# Patient Record
Sex: MALE | Race: WHITE | URBAN - METROPOLITAN AREA
[De-identification: names, ages, dates, MRNs, and addresses within clinical notes are randomized per-mention and may not be internally consistent; named-entity substitution may affect disease eponyms.]

---

## 2022-11-09 ENCOUNTER — TELEPHONE (OUTPATIENT)
Dept: INTERNAL MEDICINE CLINIC | Age: 37
End: 2022-11-09

## 2022-11-09 NOTE — TELEPHONE ENCOUNTER
----- Message from Germania Rain sent at 11/8/2022  9:16 AM EST -----  Subject: Appointment Request    Reason for Call: New Patient/New to Provider Appointment needed: New   Patient Request Appointment    QUESTIONS    Reason for appointment request? Requested Provider unavailable - Dr. Kuldeep Correa     Additional Information for Provider? Pt wife is requesting for him to   establish care with Dr. Kuldeep Correa as she is established as well.  Please   contact [facility] for scheduling  ---------------------------------------------------------------------------  --------------  Tiffany Burnette INFO  [phone]; OK to leave message on voicemail  ---------------------------------------------------------------------------  --------------  SCRIPT ANSWERS  COVID Screen: [facility]

## 2022-11-09 NOTE — TELEPHONE ENCOUNTER
[facility] not accepting new patients at this time.  Can establish care with Dr. Mee Frank or Alcario Cooks, NP.

## 2023-01-03 ENCOUNTER — TELEPHONE (OUTPATIENT)
Dept: INTERNAL MEDICINE CLINIC | Age: 38
End: 2023-01-03

## 2023-01-03 NOTE — TELEPHONE ENCOUNTER
Patient's spouse is a patient of Dr. Sabrina Ferguson, and she is hoping that her  can establish care with her as well. Informed spouse that Dr. Dawne Libman not accepting new patients but that I would check since spouse is a patient.

## 2023-01-04 ENCOUNTER — TELEPHONE (OUTPATIENT)
Dept: INTERNAL MEDICINE CLINIC | Age: 38
End: 2023-01-04

## 2023-01-04 NOTE — TELEPHONE ENCOUNTER
Tried to leave a VM but box not set up.  Wanted to let pt know that doctor Ted Washington is not accepting new pts and if he would like to see if another provider would be best for him

## 2023-03-10 ENCOUNTER — OFFICE VISIT (OUTPATIENT)
Dept: INTERNAL MEDICINE CLINIC | Age: 38
End: 2023-03-10

## 2023-03-10 VITALS
BODY MASS INDEX: 29.33 KG/M2 | WEIGHT: 198 LBS | HEIGHT: 69 IN | DIASTOLIC BLOOD PRESSURE: 75 MMHG | TEMPERATURE: 98.2 F | RESPIRATION RATE: 19 BRPM | OXYGEN SATURATION: 96 % | SYSTOLIC BLOOD PRESSURE: 121 MMHG | HEART RATE: 72 BPM

## 2023-03-10 DIAGNOSIS — Z00.00 LABORATORY EXAMINATION ORDERED AS PART OF A COMPLETE PHYSICAL EXAMINATION: ICD-10-CM

## 2023-03-10 DIAGNOSIS — M25.512 ACUTE PAIN OF LEFT SHOULDER: ICD-10-CM

## 2023-03-10 DIAGNOSIS — Z13.220 SCREENING FOR HYPERLIPIDEMIA: ICD-10-CM

## 2023-03-10 DIAGNOSIS — Z76.89 ENCOUNTER TO ESTABLISH CARE: Primary | ICD-10-CM

## 2023-03-10 DIAGNOSIS — G89.29 CHRONIC LEFT SHOULDER PAIN: ICD-10-CM

## 2023-03-10 DIAGNOSIS — R20.2 TINGLING SENSATION: ICD-10-CM

## 2023-03-10 DIAGNOSIS — Z13.1 SCREENING FOR DIABETES MELLITUS (DM): ICD-10-CM

## 2023-03-10 DIAGNOSIS — M25.512 CHRONIC LEFT SHOULDER PAIN: ICD-10-CM

## 2023-03-10 RX ORDER — IBUPROFEN 200 MG
TABLET ORAL
COMMUNITY

## 2023-03-10 RX ORDER — CHOLECALCIFEROL (VITAMIN D3) 125 MCG
CAPSULE ORAL
COMMUNITY

## 2023-03-10 NOTE — PROGRESS NOTES
Aime Dee is a 42 y.o. male  Chief Complaint   Patient presents with    Establish Care     Pt states no concerns     Visit Vitals  /75 (BP 1 Location: Right upper arm, BP Patient Position: Sitting, BP Cuff Size: Adult long)   Pulse 72   Temp 98.2 °F (36.8 °C) (Temporal)   Resp 19   Ht 5' 9\" (1.753 m)   Wt 198 lb (89.8 kg)   SpO2 96%   BMI 29.24 kg/m²          HPI  Torito Duarte presents to the clinic for establishing care and physical.  He reports that over the last 6 months, he was dealing with cracking sensation on his shoulder and some tingling sensation around his elbow. He attributes that to having a  about 6 months ago and having to play with his 11year-old. He denies any muscle weakness or neck pain or injuries. Patient otherwise is generally healthy, he tries to eat healthy diet and exercise regularly. He lives with his wife and 2 sons and works appetizing business. He does not smoke and drinks rarely. He sister has leukemia and second degree relative has pancreatic cancer. .  Past Medical History:   Diagnosis Date    Fatty liver disease, nonalcoholic     approx 5years ago            ROS  Review of Systems   All other systems reviewed and are negative. EXAM  Physical Exam  Constitutional:       General: He is not in acute distress. Appearance: Normal appearance. He is not toxic-appearing. HENT:      Head: Normocephalic and atraumatic. Right Ear: Tympanic membrane normal.      Left Ear: Tympanic membrane normal.      Nose: No congestion or rhinorrhea. Eyes:      General:         Right eye: No discharge. Extraocular Movements: Extraocular movements intact. Pupils: Pupils are equal, round, and reactive to light. Cardiovascular:      Rate and Rhythm: Normal rate and regular rhythm. Heart sounds: Normal heart sounds. No murmur heard. No gallop. Pulmonary:      Effort: Pulmonary effort is normal. No respiratory distress.       Breath sounds: Normal breath sounds. No wheezing or rales. Abdominal:      General: There is no distension. Palpations: Abdomen is soft. There is no mass. Tenderness: There is no abdominal tenderness. There is no right CVA tenderness, left CVA tenderness, guarding or rebound. Hernia: No hernia is present. Musculoskeletal:         General: No swelling or deformity. Right lower leg: No edema. Skin:     Coloration: Skin is not jaundiced. Findings: No bruising or lesion. Neurological:      General: No focal deficit present. Mental Status: He is alert and oriented to person, place, and time. Motor: No weakness. Psychiatric:         Mood and Affect: Mood normal.       Health Maintenance Due   Topic Date Due    Hepatitis C Screening  Never done    Depression Screen  Never done    COVID-19 Vaccine (1) Never done    DTaP/Tdap/Td series (1 - Tdap) Never done    Flu Vaccine (1) Never done       ASSESSMENT/PLAN    Diagnoses and all orders for this visit:    1. Encounter to establish care    2. Laboratory examination ordered as part of a complete physical examination  -     CBC WITH AUTOMATED DIFF; Future  -     METABOLIC PANEL, COMPREHENSIVE; Future  -     LIPID PANEL; Future  -     HEMOGLOBIN A1C WITH EAG; Future  -     VITAMIN B12 & FOLATE; Future  -     VITAMIN D, 25 HYDROXY; Future    3. Screening for diabetes mellitus (DM)  -     HEMOGLOBIN A1C WITH EAG; Future    4. Screening for hyperlipidemia  -     LIPID PANEL; Future    5. Tingling sensation  -     VITAMIN B12 & FOLATE; Future  -     VITAMIN D, 25 HYDROXY; Future    6. Chronic left shoulder pain  -     VITAMIN B12 & FOLATE;  Future  -     VITAMIN D, 25 HYDROXY; Future        Jas Alexander MD

## 2023-03-10 NOTE — PROGRESS NOTES
Chief Complaint   Patient presents with    Establish Care     Pt states no concerns          Vitals:    03/10/23 1527   BP: 121/75   Pulse: 72   Resp: 19   Temp: 98.2 °F (36.8 °C)   TempSrc: Temporal   SpO2: 96%   Weight: 198 lb (89.8 kg)   Height: 5' 9\" (1.753 m)   PainSc:   2   PainLoc: Arm       Current Outpatient Medications on File Prior to Visit   Medication Sig Dispense Refill    cholecalciferol, vitamin D3, (Vitamin D3) 50 mcg (2,000 unit) tab Take  by mouth. Lactobac no.41/Bifidobact no.7 (PROBIOTIC-10 PO) Take  by mouth. ibuprofen (AdviL) 200 mg tablet Take  by mouth. No current facility-administered medications on file prior to visit. Health Maintenance Due   Topic    Hepatitis C Screening     Depression Screen     COVID-19 Vaccine (1)    DTaP/Tdap/Td series (1 - Tdap)    Flu Vaccine (1)       1. \"Have you been to the ER, urgent care clinic since your last visit? Hospitalized since your last visit? \" No    2. \"Have you seen or consulted any other health care providers outside of the [address] [place] [address] since your last visit? \" No     3. For patients aged 39-70: Has the patient had a colonoscopy / FIT/ Cologuard? NA - based on age      If the patient is female:    4. For patients aged 41-77: Has the patient had a mammogram within the past 2 years? NA - based on age or sex      11. For patients aged 21-65: Has the patient had a pap smear?  NA - based on age or sex

## 2023-03-11 LAB
25(OH)D3 SERPL-MCNC: 18.3 NG/ML (ref 30–100)
ALBUMIN SERPL-MCNC: 4 G/DL (ref 3.5–5)
ALBUMIN/GLOB SERPL: 1.2 (ref 1.1–2.2)
ALP SERPL-CCNC: 62 U/L (ref 45–117)
ALT SERPL-CCNC: 130 U/L (ref 12–78)
ANION GAP SERPL CALC-SCNC: 5 MMOL/L (ref 5–15)
AST SERPL-CCNC: 40 U/L (ref 15–37)
BASOPHILS # BLD: 0.1 K/UL (ref 0–0.1)
BASOPHILS NFR BLD: 1 % (ref 0–1)
BILIRUB SERPL-MCNC: 0.3 MG/DL (ref 0.2–1)
BUN SERPL-MCNC: 12 MG/DL (ref 6–20)
BUN/CREAT SERPL: 16 (ref 12–20)
CALCIUM SERPL-MCNC: 9.6 MG/DL (ref 8.5–10.1)
CHLORIDE SERPL-SCNC: 107 MMOL/L (ref 97–108)
CHOLEST SERPL-MCNC: 254 MG/DL
CO2 SERPL-SCNC: 28 MMOL/L (ref 21–32)
CREAT SERPL-MCNC: 0.74 MG/DL (ref 0.7–1.3)
DIFFERENTIAL METHOD BLD: NORMAL
EOSINOPHIL # BLD: 0.1 K/UL (ref 0–0.4)
EOSINOPHIL NFR BLD: 1 % (ref 0–7)
ERYTHROCYTE [DISTWIDTH] IN BLOOD BY AUTOMATED COUNT: 12 % (ref 11.5–14.5)
EST. AVERAGE GLUCOSE BLD GHB EST-MCNC: 111 MG/DL
FOLATE SERPL-MCNC: 14.6 NG/ML (ref 5–21)
GLOBULIN SER CALC-MCNC: 3.4 G/DL (ref 2–4)
GLUCOSE SERPL-MCNC: 85 MG/DL (ref 65–100)
HBA1C MFR BLD: 5.5 % (ref 4–5.6)
HCT VFR BLD AUTO: 46.4 % (ref 36.6–50.3)
HDLC SERPL-MCNC: 47 MG/DL
HDLC SERPL: 5.4 (ref 0–5)
HGB BLD-MCNC: 14.9 G/DL (ref 12.1–17)
IMM GRANULOCYTES # BLD AUTO: 0 K/UL (ref 0–0.04)
IMM GRANULOCYTES NFR BLD AUTO: 0 % (ref 0–0.5)
LDLC SERPL CALC-MCNC: 151.6 MG/DL (ref 0–100)
LYMPHOCYTES # BLD: 2.4 K/UL (ref 0.8–3.5)
LYMPHOCYTES NFR BLD: 36 % (ref 12–49)
MCH RBC QN AUTO: 27.4 PG (ref 26–34)
MCHC RBC AUTO-ENTMCNC: 32.1 G/DL (ref 30–36.5)
MCV RBC AUTO: 85.3 FL (ref 80–99)
MONOCYTES # BLD: 0.7 K/UL (ref 0–1)
MONOCYTES NFR BLD: 10 % (ref 5–13)
NEUTS SEG # BLD: 3.4 K/UL (ref 1.8–8)
NEUTS SEG NFR BLD: 52 % (ref 32–75)
NRBC # BLD: 0 K/UL (ref 0–0.01)
NRBC BLD-RTO: 0 PER 100 WBC
PLATELET # BLD AUTO: 270 K/UL (ref 150–400)
PMV BLD AUTO: 10.1 FL (ref 8.9–12.9)
POTASSIUM SERPL-SCNC: 4 MMOL/L (ref 3.5–5.1)
PROT SERPL-MCNC: 7.4 G/DL (ref 6.4–8.2)
RBC # BLD AUTO: 5.44 M/UL (ref 4.1–5.7)
SODIUM SERPL-SCNC: 140 MMOL/L (ref 136–145)
TRIGL SERPL-MCNC: 277 MG/DL (ref ?–150)
VIT B12 SERPL-MCNC: 565 PG/ML (ref 193–986)
VLDLC SERPL CALC-MCNC: 55.4 MG/DL
WBC # BLD AUTO: 6.6 K/UL (ref 4.1–11.1)

## 2023-04-20 ENCOUNTER — TELEPHONE (OUTPATIENT)
Dept: INTERNAL MEDICINE CLINIC | Age: 38
End: 2023-04-20

## 2023-04-20 NOTE — TELEPHONE ENCOUNTER
Pt's wife called in asking about latest lab results. Told her letter had been sent but she said they never received it. I read her the recommendations written by Dr. Chino Cartagena in letter and there are no further questions at this time.